# Patient Record
(demographics unavailable — no encounter records)

---

## 2024-12-27 NOTE — PROCEDURE
[Locate IUD] : locate IUD [Abnormal Uterine Bleeding] : abnormal uterine bleeding [Transvaginal Ultrasound] : transvaginal ultrasound [Anteverted] : anteverted [No Fibroid(s)] : no fibroid(s) [L: ___ cm] : L: [unfilled] cm [H: ___ cm] : H: [unfilled] cm [FreeTextEntry5] : Good IUD placement is noted [FreeTextEntry7] : 1.9 x 3.0 cm [FreeTextEntry8] : 2.2 x 3.2 cm [FreeTextEntry6] : Left ovarian follicular cyst noted 1.2 x 1.3 cm

## 2024-12-27 NOTE — PHYSICAL EXAM
Josse Mcwilliams(Attending) [Chaperone Present] : A chaperone was present in the examining room during all aspects of the physical examination [FreeTextEntry2] : ANDREW [Appropriately responsive] : appropriately responsive [Alert] : alert [No Acute Distress] : no acute distress [No Lymphadenopathy] : no lymphadenopathy [Regular Rate Rhythm] : regular rate rhythm [No Murmurs] : no murmurs [Clear to Auscultation B/L] : clear to auscultation bilaterally [Soft] : soft [Non-tender] : non-tender [Non-distended] : non-distended [No HSM] : No HSM [No Lesions] : no lesions [No Mass] : no mass [Oriented x3] : oriented x3 [Labia Majora] : normal [Labia Minora] : normal [Normal] : normal [Uterine Adnexae] : normal

## 2024-12-27 NOTE — DISCUSSION/SUMMARY
[FreeTextEntry1] : Issues regarding breakthrough bleeding with Mirena IUD discussed with patient Keep menstrual calendar Follow-up yearly or as needed

## 2024-12-27 NOTE — HISTORY OF PRESENT ILLNESS
[FreeTextEntry1] : Patient is 39 years old para 4-0-0-4 last menstrual period December 17, 2024 Patient states that she had a spontaneous vaginal delivery on September 17, 2024 in [place] Patient states that her delivery was complicated by postpartum hemorrhage.  She is not breast-feeding. Patient states that she had a Mirena intrauterine device placed on November 19, 2024 Patient states that she has been noticing prolonged spotting after her last menstrual period

## 2025-05-27 NOTE — DISCUSSION/SUMMARY
[FreeTextEntry1] : Pap, gonorrhea and Chlamydia test done B VV test done urine culture sent Urine culture sent Prescribed yearly bilateral screening mammogram Keep menstrual calendar Follow-up yearly or as needed

## 2025-05-27 NOTE — HISTORY OF PRESENT ILLNESS
[FreeTextEntry1] : Patient is 39 years old para 4-0-0-4 last menstrual period December 2024 Patient request testing for STIs Patient states that she has a history of recurrent UTIs and recurrent vaginitis. 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  Urine culture sent

## 2025-05-27 NOTE — PROCEDURE
[Cervical Pap Smear] : cervical Pap smear [Liquid Base] : liquid base [Tolerated Well] : the patient tolerated the procedure well [No Complications] : there were no complications [Amenorrhea] : Amenorrhea [Locate IUD] : locate IUD [Transvaginal Ultrasound] : transvaginal ultrasound [Anteverted] : anteverted [No Fibroid(s)] : no fibroid(s) [L: ___ cm] : L: [unfilled] cm [H: ___ cm] : H: [unfilled] cm [FreeTextEntry7] : 2.0 x 3.4 cm [FreeTextEntry8] : 1.8 x 3.0 cm [FreeTextEntry6] : Bilateral ovaries contain multiple subcentimeter cysts

## 2025-05-27 NOTE — PHYSICAL EXAM
[MA] : MA [FreeTextEntry2] : ANDREW [Appropriately responsive] : appropriately responsive [Alert] : alert [No Acute Distress] : no acute distress [No Lymphadenopathy] : no lymphadenopathy [Regular Rate Rhythm] : regular rate rhythm [No Murmurs] : no murmurs [Clear to Auscultation B/L] : clear to auscultation bilaterally [Soft] : soft [Non-tender] : non-tender [Non-distended] : non-distended [No HSM] : No HSM [No Lesions] : no lesions [No Mass] : no mass [Oriented x3] : oriented x3 [Examination Of The Breasts] : a normal appearance [No Masses] : no breast masses were palpable [Labia Majora] : normal [Labia Minora] : normal [Discharge] : a  ~M vaginal discharge was present [Normal] : normal [Uterine Adnexae] : normal